# Patient Record
Sex: MALE | Race: OTHER | Employment: OTHER | URBAN - METROPOLITAN AREA
[De-identification: names, ages, dates, MRNs, and addresses within clinical notes are randomized per-mention and may not be internally consistent; named-entity substitution may affect disease eponyms.]

---

## 2019-07-25 ENCOUNTER — HOSPITAL ENCOUNTER (EMERGENCY)
Age: 59
Discharge: HOME | End: 2019-07-25
Attending: EMERGENCY MEDICINE
Payer: MEDICARE

## 2019-07-25 VITALS
HEART RATE: 98 BPM | SYSTOLIC BLOOD PRESSURE: 125 MMHG | WEIGHT: 183 LBS | OXYGEN SATURATION: 98 % | DIASTOLIC BLOOD PRESSURE: 82 MMHG | RESPIRATION RATE: 15 BRPM | TEMPERATURE: 99.2 F | HEIGHT: 67 IN | BODY MASS INDEX: 28.72 KG/M2

## 2019-07-25 DIAGNOSIS — M10.9 ACUTE GOUT OF RIGHT WRIST, UNSPECIFIED CAUSE: Primary | ICD-10-CM

## 2019-07-25 PROCEDURE — A9270 NON-COVERED ITEM OR SERVICE: HCPCS | Performed by: PHYSICIAN ASSISTANT

## 2019-07-25 PROCEDURE — 99283 EMERGENCY DEPT VISIT LOW MDM: CPT

## 2019-07-25 PROCEDURE — 74011636637 HC RX REV CODE- 636/637: Performed by: PHYSICIAN ASSISTANT

## 2019-07-25 PROCEDURE — 74011250636 HC RX REV CODE- 250/636: Performed by: PHYSICIAN ASSISTANT

## 2019-07-25 PROCEDURE — 96372 THER/PROPH/DIAG INJ SC/IM: CPT

## 2019-07-25 RX ORDER — INDOMETHACIN 75 MG/1
75 CAPSULE, EXTENDED RELEASE ORAL DAILY
COMMUNITY

## 2019-07-25 RX ORDER — COLCHICINE 0.6 MG/1
0.6 TABLET ORAL DAILY
COMMUNITY

## 2019-07-25 RX ORDER — KETOROLAC TROMETHAMINE 30 MG/ML
60 INJECTION, SOLUTION INTRAMUSCULAR; INTRAVENOUS
Status: COMPLETED | OUTPATIENT
Start: 2019-07-25 | End: 2019-07-25

## 2019-07-25 RX ORDER — TRAMADOL HYDROCHLORIDE 50 MG/1
50 TABLET ORAL
Qty: 12 TAB | Refills: 0 | Status: SHIPPED | OUTPATIENT
Start: 2019-07-25 | End: 2019-07-28

## 2019-07-25 RX ORDER — INDOMETHACIN 25 MG/1
50 CAPSULE ORAL 3 TIMES DAILY
Qty: 30 CAP | Refills: 0 | Status: SHIPPED | OUTPATIENT
Start: 2019-07-25 | End: 2019-07-30

## 2019-07-25 RX ORDER — PREDNISONE 50 MG/1
50 TABLET ORAL DAILY
Qty: 3 TAB | Refills: 0 | Status: SHIPPED | OUTPATIENT
Start: 2019-07-25 | End: 2019-07-28

## 2019-07-25 RX ADMIN — PREDNISONE 60 MG: 10 TABLET ORAL at 17:02

## 2019-07-25 RX ADMIN — KETOROLAC TROMETHAMINE 60 MG: 30 INJECTION, SOLUTION INTRAMUSCULAR at 17:03

## 2019-07-25 NOTE — ED PROVIDER NOTES
EMERGENCY DEPARTMENT HISTORY AND PHYSICAL EXAM    Date: 7/25/2019  Patient Name: Viktor Connors    History of Presenting Illness     Chief Complaint   Patient presents with    Allergic Reaction    Gout         History Provided By: Patient    Chief Complaint: Right wrist pain  Duration: 3 days  Timing: Improving  Location: Right wrist  Quality: Aching  Severity: Severe  Modifying Factors: Allopurinol helped  Associated Symptoms: none       Additional History (Context): Viktor Connors is a 59 y.o. male with a history of gout and diabetes who presents today for right wrist pain and redness. Patient reports history of gout and states this feels exactly the same. Patient reports he does not live here and is visiting and did not bring his gout medication with him. Reports he took 2 or 3 of someone else's allopurinol that he is typically allergic to but states this helped. Denies any rash shortness of breath or chest pain. Patient states he is flying home tomorrow and needs something for pain. Denies any fevers or chills. Denies any injury or trauma. PCP: [place], Not On File    Current Outpatient Medications   Medication Sig Dispense Refill    colchicine 0.6 mg tablet Take 0.6 mg by mouth daily.  indomethacin SR (INDOCIN SR) 75 mg SR capsule Take 75 mg by mouth daily.  indomethacin (INDOCIN) 25 mg capsule Take 2 Caps by mouth three (3) times daily for 5 days. With food 30 Cap 0    predniSONE (DELTASONE) 50 mg tablet Take 1 Tab by mouth daily for 3 days. 3 Tab 0    traMADol (ULTRAM) 50 mg tablet Take 1 Tab by mouth every six (6) hours as needed for Pain for up to 3 days. Max Daily Amount: 200 mg. 12 Tab 0       Past History     Past Medical History:  Past Medical History:   Diagnosis Date    Diabetes ([address]     Gout        Past Surgical History:  History reviewed. No pertinent surgical history. Family History:  History reviewed. No pertinent family history.     Social History:  Social History     Tobacco Use    Smoking status: Never Smoker    Smokeless tobacco: Never Used   Substance Use Topics    Alcohol use: Not on file    Drug use: Not on file       Allergies: Allergies   Allergen Reactions    Allopurinol Swelling    Benadr [Diphenhydramine Hcl] Anxiety    Penicillins Rash         Review of Systems   Review of Systems   Constitutional: Negative for chills and fever. HENT: Negative for congestion, rhinorrhea and sore throat. Respiratory: Negative for cough and shortness of breath. Cardiovascular: Negative for chest pain. Gastrointestinal: Negative for abdominal pain, blood in stool, constipation, diarrhea, nausea and vomiting. Genitourinary: Negative for dysuria, frequency and hematuria. Musculoskeletal: Positive for arthralgias. Negative for back pain and myalgias. Skin: Positive for color change. Negative for rash and wound. Neurological: Negative for dizziness and headaches. All other systems reviewed and are negative. All Other Systems Negative  Physical Exam     Vitals:    07/25/19 1623   BP: 125/82   Pulse: 98   Resp: 15   Temp: 99.2 °F (37.3 °C)   SpO2: 98%   Weight: 83 kg (183 lb)   Height: 5' 7\" (1.702 m)     Physical Exam   Constitutional: He is oriented to person, place, and time. He appears well-developed and well-nourished. No distress. HENT:   Head: Normocephalic and atraumatic. Eyes: Conjunctivae are normal.   Neck: Normal range of motion. Neck supple. Cardiovascular: Normal rate, regular rhythm and normal heart sounds. Pulmonary/Chest: Effort normal and breath sounds normal. No respiratory distress. He exhibits no tenderness. Abdominal: Soft. Bowel sounds are normal. He exhibits no distension. There is no tenderness. There is no rebound and no guarding. Musculoskeletal: Normal range of motion. He exhibits no edema or deformity. Right wrist: He exhibits tenderness and swelling (mild).    Mild diffuse erythema and swelling noted to right wrist.  Strong radial pulses. Tenderness to palpation noted. Full range of motion. Overlying skin is intact      Neurological: He is alert and oriented to person, place, and time. Skin: Skin is warm and dry. He is not diaphoretic. Psychiatric: He has a normal mood and affect. Nursing note and vitals reviewed. Diagnostic Study Results     Labs -   No results found for this or any previous visit (from the past 12 hour(s)). Radiologic Studies -   No orders to display     CT Results  (Last 48 hours)    None        CXR Results  (Last 48 hours)    None            Medical Decision Making   I am the first provider for this patient. I reviewed the vital signs, available nursing notes, past medical history, past surgical history, family history and social history. Vital Signs-Reviewed the patient's vital signs. Records Reviewed: Nursing Notes and Old Medical Records     Procedures: None   Procedures    Provider Notes (Medical Decision Making):     Differential: fracture, dislocation, abrasion, sprain, contusion, laceration, septic joint, Gout flare     Plan: Will give dose of steroids and Toradol here. Will discharge home with indomethacin and short course of steroids. Diabetes precautions have been given. Patient reports history of steroids working for him in the past.      MED RECONCILIATION:  No current facility-administered medications for this encounter. Current Outpatient Medications   Medication Sig    colchicine 0.6 mg tablet Take 0.6 mg by mouth daily.  indomethacin SR (INDOCIN SR) 75 mg SR capsule Take 75 mg by mouth daily.  indomethacin (INDOCIN) 25 mg capsule Take 2 Caps by mouth three (3) times daily for 5 days. With food    predniSONE (DELTASONE) 50 mg tablet Take 1 Tab by mouth daily for 3 days.  traMADol (ULTRAM) 50 mg tablet Take 1 Tab by mouth every six (6) hours as needed for Pain for up to 3 days. Max Daily Amount: 200 mg. Disposition:  Home     DISCHARGE NOTE:   Pt has been reexamined. Patient has no new complaints, changes, or physical findings. Care plan outlined and precautions discussed. Results of workup were reviewed with the patient. All medications were reviewed with the patient. All of pt's questions and concerns were addressed. Patient was instructed and agrees to follow up with PCP  as well as to return to the ED upon further deterioration. Patient is ready to go home. Follow-up Information     Follow up With Specialties Details Why [facility] EMERGENCY DEPT Emergency Medicine  As needed [address]  [phone]          Current Discharge Medication List      START taking these medications    Details   indomethacin (INDOCIN) 25 mg capsule Take 2 Caps by mouth three (3) times daily for 5 days. With food  Qty: 30 Cap, Refills: 0    Associated Diagnoses: Acute gout of right wrist, unspecified cause      predniSONE (DELTASONE) 50 mg tablet Take 1 Tab by mouth daily for 3 days. Qty: 3 Tab, Refills: 0    Associated Diagnoses: Acute gout of right wrist, unspecified cause      traMADol (ULTRAM) 50 mg tablet Take 1 Tab by mouth every six (6) hours as needed for Pain for up to 3 days. Max Daily Amount: 200 mg. Qty: 12 Tab, Refills: 0    Associated Diagnoses: Acute gout of right wrist, unspecified cause         CONTINUE these medications which have NOT CHANGED    Details   indomethacin SR (INDOCIN SR) 75 mg SR capsule Take 75 mg by mouth daily. Diagnosis     Clinical Impression:   1.  Acute gout of right wrist, unspecified cause

## 2019-07-25 NOTE — ED TRIAGE NOTES
2 days ago took allopurinol for gout which was sent by [place] . Is allergic to med.  Right hand swollen, veins engorged and right arm painful

## 2019-07-25 NOTE — DISCHARGE INSTRUCTIONS
Patient Education        Gota: Instrucciones de cuidado - [ Gout: Care Instructions ]  Instrucciones de cuidado    La gota es clay forma de artritis causada por la acumulación de bismark de ácido úrico en clay articulación. Causa ataques repentinos de dolor, hinchazón, enrojecimiento y rigidez, por lo general en clay articulación, sobre todo en el dedo bret del pie. La gota usualmente se presenta sin clay causa evidente. Ana Maria puede desencadenarse debido al consumo de alcohol (especialmente cerveza), mariscos y faraz ibanez. [place] ciertos medicamentos, mónica diuréticos o aspirina, también puede ocasionar un ataque de Street. [place] los medicamentos eunice mónica se los receten y asistir con regularidad a las consultas de seguimiento con gorman médico pueden ayudarle a evitar ataques de gota en el futuro. La atención de seguimiento es clay parte clave de gorman tratamiento y seguridad. Asegúrese de hacer y acudir a todas las citas, y llame a gorman médico si está teniendo problemas. También es clay buena idea saber los resultados de geoff exámenes y mantener clay lista de los medicamentos que regine. ¿Cómo puede cuidarse en el hogar? · Si la articulación está hinchada, colóquese hielo o clay compresa fría en la amado wing 10 a 20 minutos cada vez. Póngase un paño talley entre el hielo y la piel. · Eleve el miembro adolorido sobre clay almohada mientras se aplica hielo, o en cualquier momento que se siente o se acueste wing los 3 días siguientes. Trate de mantenerlo por encima del nivel del corazón. Elbert ayudará a reducir la hinchazón. · Descanse las articulaciones adoloridas. Evite las actividades que impliquen poner peso o esfuerzo sobre las articulaciones por unos angelica. [place] breves descansos en geoff actividades regulares wing el día. · [employer] medicamentos exactamente mónica le fueron recetados. Llame a gorman médico si caleb estar teniendo problemas con gorman medicamento.   · [employer] analgésicos (medicamentos para el dolor) exactamente según las indicaciones. ? Si el médico le recetó un analgésico, tómelo según las indicaciones. ? Si no está tomando un analgésico recetado, pregúntele a deng médico si puede theresa saravanan de [employer]. · Coma menos mariscos y faraz ibanez. · Pregúntele a deng médico antes de theresa alcohol. · Perder peso, si tiene sobrepeso, podría ayudarle a reducir los ataques de [place]. Ana Maria no noel clay Paula Slicker. La pérdida de un peso considerable en corto tiempo puede provocar un ataque de [place]. ¿Cuándo debe pedir ayuda? Llame a deng médico ahora mismo o busque atención médica inmediata si:    · Tiene fiebre.     · La articulación le duele tanto que no puede usarla.     · Tiene repentina e inexplicable hinchazón, enrojecimiento, calor o dolor intenso en clay o más articulaciones.    Preste especial atención a los cambios en deng olman y asegúrese de comunicarse con deng médico si:    · Tiene dolor en clay articulación.     · Kelly síntomas empeoran o no mejoran después de 2 ó 3 días. ¿Dónde puede encontrar más información en inglés? Cisco Rivera a [email or web address]-inocencio.info/. Georgina Higuera [id number] en la búsqueda para aprender más acerca de \"Gota: Instrucciones de cuidado - [ Gout: Care Instructions ]. \"  Revisado: 1 marcela, 2019  Versión del contenido: 12.1  © 5031-6278 Healthwise, [employer]. Las instrucciones de cuidado fueron adaptadas bajo licencia por [employer] (which disclaims liability or warranty for this information). Si usted tiene [place] afección médica o sobre estas instrucciones, siempre pregunte a deng profesional de loman. [employer] niega toda garantía o responsabilidad por deng uso de esta información. Patient Education        Goel Spray en purinas: Instrucciones de cuidado - [ Purine-Restricted Diet: Care Instructions ]  Instrucciones de [address] son sustancias que se encuentran en ciertos alimentos.  Deng cuerpo transforma las purinas en [facility] nivel alto de ácido úrico puede causar gota, clay forma de artritis que provoca dolor e inflamación en las articulaciones. Usted podría ayudar a controlar la cantidad de ácido úrico en gorman cuerpo limitando los alimentos con alto contenido en purinas de gorman Radha Ti. La atención de seguimiento es clay parte clave de gorman tratamiento y seguridad. Asegúrese de hacer y acudir a todas las citas, y llame a gorman médico si está teniendo problemas. También es clay buena idea saber los resultados de geoff exámenes y mantener clay lista de los medicamentos que regine. ¿Cómo puede cuidarse en el hogar? · Planifique geoff comidas y refrigerios con alimentos que luciano bajos en purinas y seguros para usted. Estos alimentos incluyen:  ? Verduras verdes y tomates. ? Frutas. ? Butler, arroz y cereales integrales. ? Huevos, mantequilla de cacahuate (maní) y nueces. ? Mabeline Rudder y otros productos lácteos semidescremados. ? Palomitas de maíz (\"popcorn\"). ? Postres de gelatina, chocolate, cacao, así mónica tartas y dulces en pequeñas cantidades. · Puede comer alimentos que luciano medianamente ricos en purinas, rocio solo de vez en cuando. Estos alimentos incluyen:  ? Legumbres, mónica frijoles (habichuelas) secos y arvejas (chícharos) secas. Puede comer 1 taza de legumbres cocidas al día. ? Espárragos, coliflor, espinacas, hongos y arvejas (chícharos). ? Pescados y mariscos (que no luciano mariscos muy ricos en purinas). ? Rolly, salvado de rome y germen de [place]. · Limite los alimentos muy ricos en purinas mónica:  ? Vísceras, mónica hígado, riñones, mollejas y sesos. ? Verónica, incluyendo tocino, res, cerdo y hernandez. ? Verónica de animales de caza y cualquier otro tipo de carne en grandes cantidades. ? mohsen Hamilton caballa y thalia. ? Salsa de carne (\"gravy\"). ? Adan Holder. ¿Dónde puede encontrar más información en inglés? Barb Coyle a http://latia-inocencio.info/.   Escriba [id number] en la búsqueda para aprender Angelo Peak de \"Dieta restringida en purinas: Instrucciones de cuidado - [ Purine-Restricted Diet: Care Instructions ]. \"  Revisado: 7 noviembre, 2018  Versión del contenido: 12.1  © 7453-5621 Healthwise, Incorporated. Las instrucciones de cuidado fueron adaptadas bajo licencia por [employer] (which disclaims liability or warranty for this information). Si usted tiene [place] afección médica o sobre estas instrucciones, siempre pregunte a gorman profesional de olman. Healthwise, Incorporated niega toda garantía o responsabilidad por gorman uso de esta información.